# Patient Record
Sex: FEMALE | Race: WHITE | Employment: UNEMPLOYED | URBAN - METROPOLITAN AREA
[De-identification: names, ages, dates, MRNs, and addresses within clinical notes are randomized per-mention and may not be internally consistent; named-entity substitution may affect disease eponyms.]

---

## 2017-01-23 ENCOUNTER — HOSPITAL ENCOUNTER (OUTPATIENT)
Dept: MAMMOGRAPHY | Age: 42
Discharge: HOME | End: 2017-01-23
Attending: OBSTETRICS & GYNECOLOGY
Payer: COMMERCIAL

## 2017-01-23 DIAGNOSIS — Z12.31 VISIT FOR SCREENING MAMMOGRAM: ICD-10-CM

## 2017-01-23 PROCEDURE — 77067 SCR MAMMO BI INCL CAD: CPT

## 2017-01-23 NOTE — PROGRESS NOTES
Quick Note:    Your Screening Mammogram was normal. I recommend routine follow up or with any breast concerns.     ALKA Loco      ______

## 2017-07-22 ENCOUNTER — TELEPHONE (OUTPATIENT)
Dept: OBGYN CLINIC | Facility: CLINIC | Age: 42
End: 2017-07-22

## 2017-07-22 NOTE — TELEPHONE ENCOUNTER
Patient called, she started getting her period July 13th and up til now. It's not a lot but when she wipe there still some she can see. Also, she is having vaginal dryness.  Patient aware nurse will call her back on Monday, July 24th and she was fine with

## 2017-07-24 NOTE — TELEPHONE ENCOUNTER
Pt reports irregular cycle. Usually q 26 days, lasting 5 days. This month cycle 10 days late, lasting 10 days, light bleeding. Pt also reports vaginal dryness; advised on using OTC lubricant.   Pt transferred to schedule annual, track cycle until appt, d

## 2017-10-03 ENCOUNTER — OFFICE VISIT (OUTPATIENT)
Dept: OBGYN CLINIC | Facility: CLINIC | Age: 42
End: 2017-10-03

## 2017-10-03 VITALS
HEART RATE: 79 BPM | SYSTOLIC BLOOD PRESSURE: 102 MMHG | WEIGHT: 196 LBS | HEIGHT: 64.5 IN | DIASTOLIC BLOOD PRESSURE: 68 MMHG | BODY MASS INDEX: 33.05 KG/M2

## 2017-10-03 DIAGNOSIS — Z01.419 WELL FEMALE EXAM WITH ROUTINE GYNECOLOGICAL EXAM: Primary | ICD-10-CM

## 2017-10-03 PROCEDURE — 99396 PREV VISIT EST AGE 40-64: CPT | Performed by: OBSTETRICS & GYNECOLOGY

## 2017-10-03 RX ORDER — SPIRONOLACTONE 25 MG/1
25 TABLET ORAL DAILY
Qty: 60 TABLET | Refills: 0 | Status: SHIPPED | OUTPATIENT
Start: 2017-10-03

## 2017-10-03 RX ORDER — CHOLECALCIFEROL (VITAMIN D3) 125 MCG
1 TABLET ORAL
COMMUNITY

## 2017-10-03 NOTE — PROGRESS NOTES
Annual  Still with irregular menses, lasting longer and heavier, tolerable  Bloated prior to menses  Vaginal dryness    PAP, mammogram normal within last year    ROS: No Cardiac, Respiratory, GI,  or Neurological symptoms.     PE:  GENERAL: well developed

## 2017-11-13 PROBLEM — J30.1 CHRONIC SEASONAL ALLERGIC RHINITIS DUE TO POLLEN: Status: ACTIVE | Noted: 2017-11-13

## 2017-11-14 ENCOUNTER — LAB ENCOUNTER (OUTPATIENT)
Dept: LAB | Age: 42
End: 2017-11-14
Attending: INTERNAL MEDICINE
Payer: COMMERCIAL

## 2017-11-14 DIAGNOSIS — Z00.00 ROUTINE GENERAL MEDICAL EXAMINATION AT A HEALTH CARE FACILITY: ICD-10-CM

## 2017-11-14 PROCEDURE — 82306 VITAMIN D 25 HYDROXY: CPT

## 2017-11-14 PROCEDURE — 84443 ASSAY THYROID STIM HORMONE: CPT

## 2017-11-14 PROCEDURE — 83540 ASSAY OF IRON: CPT

## 2017-11-14 PROCEDURE — 81001 URINALYSIS AUTO W/SCOPE: CPT

## 2017-11-14 PROCEDURE — 80061 LIPID PANEL: CPT

## 2017-11-14 PROCEDURE — 36415 COLL VENOUS BLD VENIPUNCTURE: CPT

## 2017-11-14 PROCEDURE — 84439 ASSAY OF FREE THYROXINE: CPT

## 2017-11-14 PROCEDURE — 85025 COMPLETE CBC W/AUTO DIFF WBC: CPT

## 2017-11-14 PROCEDURE — 82728 ASSAY OF FERRITIN: CPT

## 2017-11-14 PROCEDURE — 80053 COMPREHEN METABOLIC PANEL: CPT

## 2017-11-14 PROCEDURE — 83550 IRON BINDING TEST: CPT

## 2017-12-22 ENCOUNTER — HOSPITAL ENCOUNTER (OUTPATIENT)
Age: 42
Discharge: HOME | End: 2017-12-22
Attending: FAMILY MEDICINE
Payer: COMMERCIAL

## 2017-12-22 VITALS
HEART RATE: 70 BPM | DIASTOLIC BLOOD PRESSURE: 71 MMHG | TEMPERATURE: 99 F | OXYGEN SATURATION: 98 % | SYSTOLIC BLOOD PRESSURE: 116 MMHG | RESPIRATION RATE: 18 BRPM

## 2017-12-22 DIAGNOSIS — J01.90 ACUTE SINUSITIS, RECURRENCE NOT SPECIFIED, UNSPECIFIED LOCATION: Primary | ICD-10-CM

## 2017-12-22 PROCEDURE — 99213 OFFICE O/P EST LOW 20 MIN: CPT

## 2017-12-22 PROCEDURE — 99204 OFFICE O/P NEW MOD 45 MIN: CPT

## 2017-12-22 RX ORDER — AMOXICILLIN AND CLAVULANATE POTASSIUM 875; 125 MG/1; MG/1
1 TABLET, FILM COATED ORAL 2 TIMES DAILY
Qty: 20 TABLET | Refills: 0 | Status: SHIPPED | OUTPATIENT
Start: 2017-12-22 | End: 2018-01-01

## 2017-12-22 RX ORDER — FLUCONAZOLE 150 MG/1
150 TABLET ORAL ONCE
Qty: 2 TABLET | Refills: 0 | Status: SHIPPED | OUTPATIENT
Start: 2017-12-22 | End: 2017-12-22

## 2017-12-22 NOTE — ED PROVIDER NOTES
Patient Seen in: [id number] [facility]    History   Patient presents with:  Sinus Problem    Stated Complaint: sinus    HPI    78-year-old female with no significant past medical history presents today for sinus congestion, headaches.   This bee None (Room air)    Current:/71   Pulse 70   Temp 98.5 °F (36.9 °C) (Temporal)   Resp 18   LMP 12/21/2017   SpO2 98%         Physical Exam   Constitutional: She is oriented to person, place, and time. She appears well-developed and well-nourished.    H Discharge Medication List

## 2017-12-22 NOTE — ED INITIAL ASSESSMENT (HPI)
Patient has been stating sinus pain and pressure that has been getting worse over the last couple of days. No fevers. Tylenol/Advil helps with pain.

## 2018-11-14 ENCOUNTER — CHARTING TRANS (OUTPATIENT)
Dept: OTHER | Age: 43
End: 2018-11-14

## 2019-03-08 ENCOUNTER — LAB ENCOUNTER (OUTPATIENT)
Dept: LAB | Age: 44
End: 2019-03-08
Attending: INTERNAL MEDICINE
Payer: COMMERCIAL

## 2019-03-08 DIAGNOSIS — Z13.1 DIABETES MELLITUS SCREENING: ICD-10-CM

## 2019-03-08 DIAGNOSIS — Z13.220 LIPID SCREENING: ICD-10-CM

## 2019-03-08 DIAGNOSIS — D50.8 OTHER IRON DEFICIENCY ANEMIA: ICD-10-CM

## 2019-03-08 DIAGNOSIS — E55.9 VITAMIN D DEFICIENCY: ICD-10-CM

## 2019-03-08 LAB
ALBUMIN SERPL-MCNC: 4 G/DL (ref 3.4–5)
ALBUMIN/GLOB SERPL: 1.2 {RATIO} (ref 1–2)
ALP LIVER SERPL-CCNC: 52 U/L (ref 37–98)
ALT SERPL-CCNC: 27 U/L (ref 13–56)
ANION GAP SERPL CALC-SCNC: 6 MMOL/L (ref 0–18)
AST SERPL-CCNC: 23 U/L (ref 15–37)
BASOPHILS # BLD AUTO: 0.08 X10(3) UL (ref 0–0.2)
BASOPHILS NFR BLD AUTO: 1.1 %
BILIRUB SERPL-MCNC: 0.7 MG/DL (ref 0.1–2)
BILIRUB UR QL STRIP.AUTO: NEGATIVE
BUN BLD-MCNC: 13 MG/DL (ref 7–18)
BUN/CREAT SERPL: 16.9 (ref 10–20)
CALCIUM BLD-MCNC: 8.6 MG/DL (ref 8.5–10.1)
CHLORIDE SERPL-SCNC: 106 MMOL/L (ref 98–107)
CHOLEST SMN-MCNC: 201 MG/DL (ref ?–200)
CLARITY UR REFRACT.AUTO: CLEAR
CO2 SERPL-SCNC: 27 MMOL/L (ref 21–32)
COLOR UR AUTO: YELLOW
CREAT BLD-MCNC: 0.77 MG/DL (ref 0.55–1.02)
DEPRECATED HBV CORE AB SER IA-ACNC: 28.3 NG/ML (ref 12–240)
DEPRECATED RDW RBC AUTO: 42.4 FL (ref 35.1–46.3)
EOSINOPHIL # BLD AUTO: 0.32 X10(3) UL (ref 0–0.7)
EOSINOPHIL NFR BLD AUTO: 4.5 %
ERYTHROCYTE [DISTWIDTH] IN BLOOD BY AUTOMATED COUNT: 12.7 % (ref 11–15)
EST. AVERAGE GLUCOSE BLD GHB EST-MCNC: 100 MG/DL (ref 68–126)
GLOBULIN PLAS-MCNC: 3.4 G/DL (ref 2.8–4.4)
GLUCOSE BLD-MCNC: 83 MG/DL (ref 70–99)
GLUCOSE UR STRIP.AUTO-MCNC: NEGATIVE MG/DL
HBA1C MFR BLD HPLC: 5.1 % (ref ?–5.7)
HCT VFR BLD AUTO: 42.9 % (ref 35–48)
HDLC SERPL-MCNC: 64 MG/DL (ref 40–59)
HGB BLD-MCNC: 14 G/DL (ref 12–16)
HYALINE CASTS #/AREA URNS AUTO: PRESENT /LPF
IMM GRANULOCYTES # BLD AUTO: 0.01 X10(3) UL (ref 0–1)
IMM GRANULOCYTES NFR BLD: 0.1 %
IRON SATURATION: 36 % (ref 15–50)
IRON SERPL-MCNC: 151 UG/DL (ref 50–170)
KETONES UR STRIP.AUTO-MCNC: NEGATIVE MG/DL
LDLC SERPL CALC-MCNC: 118 MG/DL (ref ?–100)
LEUKOCYTE ESTERASE UR QL STRIP.AUTO: NEGATIVE
LYMPHOCYTES # BLD AUTO: 1.98 X10(3) UL (ref 1–4)
LYMPHOCYTES NFR BLD AUTO: 27.8 %
M PROTEIN MFR SERPL ELPH: 7.4 G/DL (ref 6.4–8.2)
MCH RBC QN AUTO: 29.6 PG (ref 26–34)
MCHC RBC AUTO-ENTMCNC: 32.6 G/DL (ref 31–37)
MCV RBC AUTO: 90.7 FL (ref 80–100)
MONOCYTES # BLD AUTO: 0.57 X10(3) UL (ref 0.1–1)
MONOCYTES NFR BLD AUTO: 8 %
NEUTROPHILS # BLD AUTO: 4.16 X10 (3) UL (ref 1.5–7.7)
NEUTROPHILS # BLD AUTO: 4.16 X10(3) UL (ref 1.5–7.7)
NEUTROPHILS NFR BLD AUTO: 58.5 %
NITRITE UR QL STRIP.AUTO: NEGATIVE
NONHDLC SERPL-MCNC: 137 MG/DL (ref ?–130)
OSMOLALITY SERPL CALC.SUM OF ELEC: 287 MOSM/KG (ref 275–295)
PH UR STRIP.AUTO: 6 [PH] (ref 4.5–8)
PLATELET # BLD AUTO: 222 10(3)UL (ref 150–450)
POTASSIUM SERPL-SCNC: 3.9 MMOL/L (ref 3.5–5.1)
PROT UR STRIP.AUTO-MCNC: NEGATIVE MG/DL
RBC # BLD AUTO: 4.73 X10(6)UL (ref 3.8–5.3)
SODIUM SERPL-SCNC: 139 MMOL/L (ref 136–145)
SP GR UR STRIP.AUTO: 1.02 (ref 1–1.03)
T4 FREE SERPL-MCNC: 0.8 NG/DL (ref 0.8–1.7)
TOTAL IRON BINDING CAPACITY: 420 UG/DL (ref 240–450)
TRANSFERRIN SERPL-MCNC: 282 MG/DL (ref 200–360)
TRIGL SERPL-MCNC: 93 MG/DL (ref 30–149)
TSI SER-ACNC: 2.54 MIU/ML (ref 0.36–3.74)
UROBILINOGEN UR STRIP.AUTO-MCNC: <2 MG/DL
VIT D+METAB SERPL-MCNC: 38.3 NG/ML (ref 30–100)
VLDLC SERPL CALC-MCNC: 19 MG/DL (ref 0–30)
WBC # BLD AUTO: 7.1 X10(3) UL (ref 4–11)

## 2019-03-08 PROCEDURE — 80061 LIPID PANEL: CPT

## 2019-03-08 PROCEDURE — 82728 ASSAY OF FERRITIN: CPT

## 2019-03-08 PROCEDURE — 83540 ASSAY OF IRON: CPT

## 2019-03-08 PROCEDURE — 82306 VITAMIN D 25 HYDROXY: CPT

## 2019-03-08 PROCEDURE — 84443 ASSAY THYROID STIM HORMONE: CPT

## 2019-03-08 PROCEDURE — 80053 COMPREHEN METABOLIC PANEL: CPT

## 2019-03-08 PROCEDURE — 84439 ASSAY OF FREE THYROXINE: CPT

## 2019-03-08 PROCEDURE — 83036 HEMOGLOBIN GLYCOSYLATED A1C: CPT

## 2019-03-08 PROCEDURE — 81001 URINALYSIS AUTO W/SCOPE: CPT

## 2019-03-08 PROCEDURE — 83550 IRON BINDING TEST: CPT

## 2019-03-08 PROCEDURE — 85025 COMPLETE CBC W/AUTO DIFF WBC: CPT

## 2019-03-20 PROBLEM — M72.2 PLANTAR FASCIITIS OF RIGHT FOOT: Status: ACTIVE | Noted: 2019-03-20

## 2019-03-20 PROBLEM — E66.811 OBESITY (BMI 30.0-34.9): Status: ACTIVE | Noted: 2019-03-20

## 2019-03-20 PROBLEM — E66.9 OBESITY (BMI 30.0-34.9): Status: ACTIVE | Noted: 2019-03-20

## 2019-03-28 ENCOUNTER — WALK IN (OUTPATIENT)
Dept: URGENT CARE | Age: 44
End: 2019-03-28

## 2019-03-28 VITALS
DIASTOLIC BLOOD PRESSURE: 64 MMHG | TEMPERATURE: 98.3 F | RESPIRATION RATE: 20 BRPM | OXYGEN SATURATION: 99 % | SYSTOLIC BLOOD PRESSURE: 108 MMHG | HEART RATE: 82 BPM

## 2019-03-28 DIAGNOSIS — J06.9 URI, ACUTE: Primary | ICD-10-CM

## 2019-03-28 DIAGNOSIS — J32.9 SINUSITIS, UNSPECIFIED CHRONICITY, UNSPECIFIED LOCATION: ICD-10-CM

## 2019-03-28 DIAGNOSIS — J98.01 BRONCHOSPASM: ICD-10-CM

## 2019-03-28 PROCEDURE — 99204 OFFICE O/P NEW MOD 45 MIN: CPT | Performed by: PEDIATRICS

## 2019-03-28 RX ORDER — CHOLECALCIFEROL (VITAMIN D3) 125 MCG
1 TABLET ORAL
COMMUNITY

## 2019-03-28 RX ORDER — BENZONATATE 200 MG/1
200 CAPSULE ORAL 3 TIMES DAILY PRN
Qty: 30 CAPSULE | Refills: 0 | Status: SHIPPED | OUTPATIENT
Start: 2019-03-28 | End: 2019-04-07

## 2019-03-28 RX ORDER — CETIRIZINE HYDROCHLORIDE 10 MG/1
TABLET ORAL
COMMUNITY

## 2019-03-28 RX ORDER — FLUTICASONE PROPIONATE 50 MCG
2 SPRAY, SUSPENSION (ML) NASAL
COMMUNITY
Start: 2019-02-25

## 2019-03-28 RX ORDER — CEFDINIR 300 MG/1
300 CAPSULE ORAL 2 TIMES DAILY
Qty: 20 CAPSULE | Refills: 0 | Status: SHIPPED | OUTPATIENT
Start: 2019-03-28 | End: 2019-03-28 | Stop reason: ALTCHOICE

## 2019-03-28 RX ORDER — AMOXICILLIN AND CLAVULANATE POTASSIUM 875; 125 MG/1; MG/1
1 TABLET, FILM COATED ORAL EVERY 12 HOURS
Qty: 20 TABLET | Refills: 0 | Status: SHIPPED | OUTPATIENT
Start: 2019-03-28 | End: 2019-09-19 | Stop reason: SDUPTHER

## 2019-03-28 RX ORDER — SENNOSIDES 8.6 MG
CAPSULE ORAL
COMMUNITY

## 2019-03-28 RX ORDER — ALBUTEROL SULFATE 90 UG/1
AEROSOL, METERED RESPIRATORY (INHALATION)
Qty: 1 INHALER | Refills: 1 | Status: SHIPPED | OUTPATIENT
Start: 2019-03-28

## 2019-09-19 ENCOUNTER — WALK IN (OUTPATIENT)
Dept: URGENT CARE | Age: 44
End: 2019-09-19

## 2019-09-19 VITALS
TEMPERATURE: 98.1 F | OXYGEN SATURATION: 100 % | HEART RATE: 82 BPM | DIASTOLIC BLOOD PRESSURE: 64 MMHG | RESPIRATION RATE: 14 BRPM | SYSTOLIC BLOOD PRESSURE: 118 MMHG

## 2019-09-19 DIAGNOSIS — J32.9 SINUSITIS, UNSPECIFIED CHRONICITY, UNSPECIFIED LOCATION: Primary | ICD-10-CM

## 2019-09-19 PROCEDURE — 99214 OFFICE O/P EST MOD 30 MIN: CPT | Performed by: FAMILY MEDICINE

## 2019-09-19 RX ORDER — AMOXICILLIN AND CLAVULANATE POTASSIUM 875; 125 MG/1; MG/1
1 TABLET, FILM COATED ORAL 2 TIMES DAILY
Qty: 20 TABLET | Refills: 0 | Status: SHIPPED | OUTPATIENT
Start: 2019-09-19 | End: 2019-09-29

## 2020-09-28 ENCOUNTER — OFFICE VISIT (OUTPATIENT)
Dept: OBGYN CLINIC | Facility: CLINIC | Age: 45
End: 2020-09-28
Payer: COMMERCIAL

## 2020-09-28 VITALS
TEMPERATURE: 98 F | WEIGHT: 198 LBS | SYSTOLIC BLOOD PRESSURE: 120 MMHG | DIASTOLIC BLOOD PRESSURE: 78 MMHG | BODY MASS INDEX: 33.8 KG/M2 | HEIGHT: 64 IN

## 2020-09-28 DIAGNOSIS — Z12.4 SCREENING FOR MALIGNANT NEOPLASM OF THE CERVIX: ICD-10-CM

## 2020-09-28 DIAGNOSIS — Z12.31 VISIT FOR SCREENING MAMMOGRAM: ICD-10-CM

## 2020-09-28 DIAGNOSIS — Z01.419 WELL FEMALE EXAM WITH ROUTINE GYNECOLOGICAL EXAM: Primary | ICD-10-CM

## 2020-09-28 PROCEDURE — 88175 CYTOPATH C/V AUTO FLUID REDO: CPT | Performed by: OBSTETRICS & GYNECOLOGY

## 2020-09-28 PROCEDURE — 99396 PREV VISIT EST AGE 40-64: CPT | Performed by: OBSTETRICS & GYNECOLOGY

## 2020-09-28 PROCEDURE — 3074F SYST BP LT 130 MM HG: CPT | Performed by: OBSTETRICS & GYNECOLOGY

## 2020-09-28 PROCEDURE — 3078F DIAST BP <80 MM HG: CPT | Performed by: OBSTETRICS & GYNECOLOGY

## 2020-09-28 PROCEDURE — 3008F BODY MASS INDEX DOCD: CPT | Performed by: OBSTETRICS & GYNECOLOGY

## 2020-09-28 NOTE — PROGRESS NOTES
Annual  Last seen 2017  Menses more regular now  Still with night sweats, hot flashes, sometimes, she is OK  Had RLQ pain, seen in ED August 2020, had small right ovarian cyst  No vaginal dryness, produces mucous    ROS: No Cardiac, Respiratory, GI,  or

## 2020-10-15 ENCOUNTER — ULTRASOUND ENCOUNTER (OUTPATIENT)
Dept: OBGYN CLINIC | Facility: CLINIC | Age: 45
End: 2020-10-15
Payer: COMMERCIAL

## 2020-10-19 ENCOUNTER — TELEPHONE (OUTPATIENT)
Dept: OBGYN CLINIC | Facility: CLINIC | Age: 45
End: 2020-10-19

## 2020-10-19 DIAGNOSIS — N83.201 RIGHT OVARIAN CYST: Primary | ICD-10-CM

## 2020-10-19 PROCEDURE — 76830 TRANSVAGINAL US NON-OB: CPT | Performed by: OBSTETRICS & GYNECOLOGY

## 2020-10-19 PROCEDURE — 76856 US EXAM PELVIC COMPLETE: CPT | Performed by: OBSTETRICS & GYNECOLOGY

## 2020-11-12 ENCOUNTER — LAB ENCOUNTER (OUTPATIENT)
Dept: LAB | Age: 45
End: 2020-11-12
Attending: INTERNAL MEDICINE
Payer: COMMERCIAL

## 2020-11-12 ENCOUNTER — HOSPITAL ENCOUNTER (OUTPATIENT)
Dept: MAMMOGRAPHY | Age: 45
Discharge: HOME | End: 2020-11-12
Attending: OBSTETRICS & GYNECOLOGY
Payer: COMMERCIAL

## 2020-11-12 DIAGNOSIS — Z00.00 ROUTINE GENERAL MEDICAL EXAMINATION AT A HEALTH CARE FACILITY: ICD-10-CM

## 2020-11-12 DIAGNOSIS — Z12.31 VISIT FOR SCREENING MAMMOGRAM: ICD-10-CM

## 2020-11-12 PROCEDURE — 81001 URINALYSIS AUTO W/SCOPE: CPT

## 2020-11-12 PROCEDURE — 87086 URINE CULTURE/COLONY COUNT: CPT

## 2020-11-12 PROCEDURE — 82306 VITAMIN D 25 HYDROXY: CPT

## 2020-11-12 PROCEDURE — 83550 IRON BINDING TEST: CPT

## 2020-11-12 PROCEDURE — 77067 SCR MAMMO BI INCL CAD: CPT | Performed by: OBSTETRICS & GYNECOLOGY

## 2020-11-12 PROCEDURE — 36415 COLL VENOUS BLD VENIPUNCTURE: CPT

## 2020-11-12 PROCEDURE — 84443 ASSAY THYROID STIM HORMONE: CPT

## 2020-11-12 PROCEDURE — 80061 LIPID PANEL: CPT

## 2020-11-12 PROCEDURE — 84439 ASSAY OF FREE THYROXINE: CPT

## 2020-11-12 PROCEDURE — 82728 ASSAY OF FERRITIN: CPT

## 2020-11-12 PROCEDURE — 83540 ASSAY OF IRON: CPT

## 2020-12-22 ENCOUNTER — ULTRASOUND ENCOUNTER (OUTPATIENT)
Dept: OBGYN CLINIC | Facility: CLINIC | Age: 45
End: 2020-12-22
Payer: COMMERCIAL

## 2020-12-22 DIAGNOSIS — N83.201 RIGHT OVARIAN CYST: Primary | ICD-10-CM

## 2020-12-22 PROCEDURE — 76830 TRANSVAGINAL US NON-OB: CPT | Performed by: OBSTETRICS & GYNECOLOGY

## 2020-12-22 PROCEDURE — 76856 US EXAM PELVIC COMPLETE: CPT | Performed by: OBSTETRICS & GYNECOLOGY

## 2021-12-12 ENCOUNTER — HOSPITAL ENCOUNTER (OUTPATIENT)
Age: 46
Discharge: HOME | End: 2021-12-12
Payer: COMMERCIAL

## 2021-12-12 VITALS — TEMPERATURE: 98 F

## 2021-12-12 PROCEDURE — 90686 IIV4 VACC NO PRSV 0.5 ML IM: CPT

## 2021-12-12 PROCEDURE — 90471 IMMUNIZATION ADMIN: CPT

## 2022-03-03 PROBLEM — M72.2 PLANTAR FASCIITIS OF RIGHT FOOT: Status: RESOLVED | Noted: 2019-03-20 | Resolved: 2022-03-03

## 2022-03-04 PROBLEM — E78.00 ELEVATED CHOLESTEROL: Status: ACTIVE | Noted: 2022-03-04

## 2022-03-15 ENCOUNTER — OFFICE VISIT (OUTPATIENT)
Dept: OBGYN CLINIC | Facility: CLINIC | Age: 47
End: 2022-03-15
Payer: COMMERCIAL

## 2022-03-15 VITALS
HEIGHT: 63.75 IN | DIASTOLIC BLOOD PRESSURE: 70 MMHG | SYSTOLIC BLOOD PRESSURE: 114 MMHG | WEIGHT: 177.19 LBS | BODY MASS INDEX: 30.62 KG/M2 | HEART RATE: 80 BPM

## 2022-03-15 DIAGNOSIS — Z12.31 VISIT FOR SCREENING MAMMOGRAM: ICD-10-CM

## 2022-03-15 DIAGNOSIS — Z01.419 WELL FEMALE EXAM WITH ROUTINE GYNECOLOGICAL EXAM: Primary | ICD-10-CM

## 2022-03-15 PROCEDURE — 3074F SYST BP LT 130 MM HG: CPT | Performed by: OBSTETRICS & GYNECOLOGY

## 2022-03-15 PROCEDURE — 3008F BODY MASS INDEX DOCD: CPT | Performed by: OBSTETRICS & GYNECOLOGY

## 2022-03-15 PROCEDURE — 99396 PREV VISIT EST AGE 40-64: CPT | Performed by: OBSTETRICS & GYNECOLOGY

## 2022-03-15 PROCEDURE — 3078F DIAST BP <80 MM HG: CPT | Performed by: OBSTETRICS & GYNECOLOGY

## 2022-03-15 RX ORDER — LANSOPRAZOLE 30 MG/1
TABLET, ORALLY DISINTEGRATING, DELAYED RELEASE ORAL
COMMUNITY
Start: 2022-02-10

## 2022-03-15 NOTE — PROGRESS NOTES
Annual  No C/O  No menses over two months, has gone 6 months  Daughter is 14  Vaginal dryness, ERT discussed  OTC estroven etc, will try    ROS: No Cardiac, Respiratory, GI,  or Neurological symptoms.     PE:  GENERAL: well developed, well nourished, in no apparent distress alert oriented x 3  SKIN: no rashes, no suspicious lesions  NECK: supple; no thyroidmegaly, no adenopathy  LUNGS: clear  COR: regular  BREAST: firm nontender, no palpable masses or LN's, no nipple discharge, no skin retraction, no axillary adenopathy,   ABDOMEN: Soft, non distended; non tender, no masses  GYNE/: External Genitalia: Normal      Vagina: normal      Uterus: mid, mobile, non tender, Normal size     Cervix: NL, no lesions     Adnexa: non tender, no masses,   EXTREMITIES:  non tender without edema or DVT  LYMPH NODES:negative    A/P: Normal exam  PAP normal 2020  Mammogram

## 2022-04-06 ENCOUNTER — HOSPITAL ENCOUNTER (OUTPATIENT)
Dept: MAMMOGRAPHY | Age: 47
Discharge: HOME | End: 2022-04-06
Attending: OBSTETRICS & GYNECOLOGY
Payer: COMMERCIAL

## 2022-04-06 DIAGNOSIS — Z12.31 VISIT FOR SCREENING MAMMOGRAM: ICD-10-CM

## 2022-04-06 PROCEDURE — 77067 SCR MAMMO BI INCL CAD: CPT | Performed by: OBSTETRICS & GYNECOLOGY

## 2023-12-01 ENCOUNTER — HOSPITAL ENCOUNTER (OUTPATIENT)
Age: 48
Discharge: HOME | End: 2023-12-01
Payer: COMMERCIAL

## 2023-12-01 VITALS
TEMPERATURE: 98 F | SYSTOLIC BLOOD PRESSURE: 130 MMHG | RESPIRATION RATE: 16 BRPM | WEIGHT: 175 LBS | BODY MASS INDEX: 29.88 KG/M2 | OXYGEN SATURATION: 100 % | HEIGHT: 64 IN | DIASTOLIC BLOOD PRESSURE: 81 MMHG | HEART RATE: 80 BPM

## 2023-12-01 DIAGNOSIS — J01.40 ACUTE NON-RECURRENT PANSINUSITIS: ICD-10-CM

## 2023-12-01 DIAGNOSIS — R09.81 NASAL CONGESTION: Primary | ICD-10-CM

## 2023-12-01 LAB — SARS-COV-2 RNA RESP QL NAA+PROBE: NOT DETECTED

## 2023-12-01 PROCEDURE — 99203 OFFICE O/P NEW LOW 30 MIN: CPT | Performed by: PHYSICIAN ASSISTANT

## 2023-12-01 PROCEDURE — U0002 COVID-19 LAB TEST NON-CDC: HCPCS | Performed by: PHYSICIAN ASSISTANT

## 2023-12-01 RX ORDER — AMOXICILLIN AND CLAVULANATE POTASSIUM 875; 125 MG/1; MG/1
1 TABLET, FILM COATED ORAL 2 TIMES DAILY
Qty: 20 TABLET | Refills: 0 | Status: SHIPPED | OUTPATIENT
Start: 2023-12-01 | End: 2023-12-11

## 2023-12-01 NOTE — DISCHARGE INSTRUCTIONS
Continue your flonase  Continue your zyrtec take augmentin twice a day until gone  Follow up with primary care doctor in 48 hours  Return to the ER if symptoms worsen

## 2023-12-01 NOTE — ED INITIAL ASSESSMENT (HPI)
Sudden onset of sinus congestion and cough while traveling. Taking allergy med and flonase. Onset Sunday and home yesterday.

## 2023-12-18 ENCOUNTER — HOSPITAL ENCOUNTER (OUTPATIENT)
Age: 48
Discharge: HOME | End: 2023-12-18
Payer: COMMERCIAL

## 2023-12-18 VITALS
TEMPERATURE: 98 F | BODY MASS INDEX: 29.88 KG/M2 | DIASTOLIC BLOOD PRESSURE: 70 MMHG | OXYGEN SATURATION: 100 % | RESPIRATION RATE: 16 BRPM | WEIGHT: 175 LBS | HEART RATE: 78 BPM | HEIGHT: 64 IN | SYSTOLIC BLOOD PRESSURE: 129 MMHG

## 2023-12-18 DIAGNOSIS — B34.9 VIRAL SYNDROME: Primary | ICD-10-CM

## 2023-12-18 LAB
POCT INFLUENZA A: NEGATIVE
POCT INFLUENZA B: NEGATIVE
SARS-COV-2 RNA RESP QL NAA+PROBE: NOT DETECTED

## 2023-12-18 PROCEDURE — 87502 INFLUENZA DNA AMP PROBE: CPT | Performed by: PHYSICIAN ASSISTANT

## 2023-12-18 PROCEDURE — U0002 COVID-19 LAB TEST NON-CDC: HCPCS | Performed by: PHYSICIAN ASSISTANT

## 2023-12-18 PROCEDURE — 99213 OFFICE O/P EST LOW 20 MIN: CPT | Performed by: PHYSICIAN ASSISTANT

## 2023-12-18 RX ORDER — OSELTAMIVIR PHOSPHATE 75 MG/1
75 CAPSULE ORAL 2 TIMES DAILY
Qty: 10 CAPSULE | Refills: 0 | Status: SHIPPED | OUTPATIENT
Start: 2023-12-18 | End: 2023-12-23

## 2023-12-18 RX ORDER — ALBUTEROL SULFATE 90 UG/1
2 AEROSOL, METERED RESPIRATORY (INHALATION) EVERY 4 HOURS PRN
Qty: 1 EACH | Refills: 0 | Status: SHIPPED | OUTPATIENT
Start: 2023-12-18 | End: 2024-01-17

## 2023-12-18 RX ORDER — BENZONATATE 100 MG/1
100 CAPSULE ORAL 3 TIMES DAILY PRN
Qty: 30 CAPSULE | Refills: 0 | Status: SHIPPED | OUTPATIENT
Start: 2023-12-18 | End: 2024-01-17

## 2023-12-18 NOTE — DISCHARGE INSTRUCTIONS
COVID and influenza are negative. Recommend starting Tamiflu regardless. Tessalon and albuterol provided.   If you develop acute chest pain or shortness of breath please return for chest x-ray to rule out pneumonia

## 2024-09-30 ENCOUNTER — HOSPITAL ENCOUNTER (OUTPATIENT)
Age: 49
Discharge: HOME | End: 2024-09-30
Payer: COMMERCIAL

## 2024-09-30 VITALS
OXYGEN SATURATION: 97 % | DIASTOLIC BLOOD PRESSURE: 88 MMHG | SYSTOLIC BLOOD PRESSURE: 121 MMHG | HEART RATE: 68 BPM | TEMPERATURE: 98 F | RESPIRATION RATE: 18 BRPM

## 2024-09-30 DIAGNOSIS — N30.01 ACUTE CYSTITIS WITH HEMATURIA: Primary | ICD-10-CM

## 2024-09-30 LAB
BILIRUB UR QL STRIP: NEGATIVE
COLOR UR: YELLOW
GLUCOSE UR STRIP-MCNC: NEGATIVE MG/DL
KETONES UR STRIP-MCNC: NEGATIVE MG/DL
NITRITE UR QL STRIP: NEGATIVE
PH UR STRIP: 5.5 [PH]
PROT UR STRIP-MCNC: NEGATIVE MG/DL
SP GR UR STRIP: 1.01
UROBILINOGEN UR STRIP-ACNC: <2 MG/DL

## 2024-09-30 PROCEDURE — 99204 OFFICE O/P NEW MOD 45 MIN: CPT | Performed by: NURSE PRACTITIONER

## 2024-09-30 PROCEDURE — 81002 URINALYSIS NONAUTO W/O SCOPE: CPT | Performed by: NURSE PRACTITIONER

## 2024-09-30 RX ORDER — CEPHALEXIN 500 MG/1
500 CAPSULE ORAL 2 TIMES DAILY
Qty: 14 CAPSULE | Refills: 0 | Status: SHIPPED | OUTPATIENT
Start: 2024-09-30 | End: 2024-10-07

## 2024-09-30 RX ORDER — PHENAZOPYRIDINE HYDROCHLORIDE 100 MG/1
100 TABLET, FILM COATED ORAL 3 TIMES DAILY PRN
Qty: 6 TABLET | Refills: 0 | Status: SHIPPED | OUTPATIENT
Start: 2024-09-30 | End: 2024-10-07

## 2024-09-30 NOTE — ED PROVIDER NOTES
Patient Seen in: [facility]      History     Chief Complaint   Patient presents with    Urinary Symptoms     Pressure, some pain in lower abdomen.   Discomfort. - Entered by patient    Dysuria     Stated Complaint: Urinary Symptoms - Pressure, some pain in lower abdomen.   Discomfort.    Subjective:   HPI    49-year-old female presents to me care with complaints of increased urine frequency and urgency with no burning for last couple days.  Increasing pressure over the bladder.  Last menstrual cycle was over 2 years ago.  Denies any vaginal discharge or odors.  Denies any flank pain no fever no nausea vomit diarrhea.  No other concerns at this time.  The patient's medication list, past medical history and social history elements as listed in today's nurse's notes were reviewed and agreed (except as otherwise stated in the HPI).  The patient's family history reviewed and determined to be noncontributory to the presenting problem.      Objective:   Past Medical History:    Allergic rhinitis    ANEMIA    Endometriosis    Fe deficiency anemia    GERD (gastroesophageal reflux disease)    egd    Pap smear for cervical cancer screening    wnl              Past Surgical History:   Procedure Laterality Date    Laparoscopy,pelvic,biopsy  2006                Social History     Socioeconomic History    Marital status:     Number of children: 1   Occupational History    Occupation: homemaker   Tobacco Use    Smoking status: Former     Current packs/day: 0.50     Average packs/day: 0.5 packs/day for 10.0 years (5.0 ttl pk-yrs)     Types: Cigarettes    Smokeless tobacco: Never   Vaping Use    Vaping status: Never Used   Substance and Sexual Activity    Alcohol use: Yes     Alcohol/week: 4.0 - 6.0 standard drinks of alcohol     Types: 4 - 6 Standard drinks or equivalent per week     Comment: Occasional    Drug use: No    Sexual activity: Yes     Partners: Male     Birth control/protection: Condom, Vasectomy    Other Topics Concern    Caffeine Concern Yes     Comment: 2-4 cups of coffee x day    Exercise Yes     Comment: 2-3 x week, cardio, weights    Seat Belt Yes              Review of Systems    Positive for stated Chief Complaint: Urinary Symptoms (Pressure, some pain in lower abdomen.   Discomfort. - Entered by patient) and Dysuria    Other systems are as noted in HPI.  Constitutional and vital signs reviewed.      All other systems reviewed and negative except as noted above.    Physical Exam     ED Triage Vitals [09/30/24 1459]   /88   Pulse 68   Resp 18   Temp 97.9 °F (36.6 °C)   Temp src Temporal   SpO2 97 %   O2 Device None (Room air)       Current Vitals:   Vital Signs  BP: 121/88  Pulse: 68  Resp: 18  Temp: 97.9 °F (36.6 °C)  Temp src: Temporal    Oxygen Therapy  SpO2: 97 %  O2 Device: None (Room air)            Physical Exam    GENERAL: The patient is well-developed well-nourished nontoxic, non-ill-appearing  Abdomen: Soft, nontender nondistended negative CVA tenderness bowel sounds are all 4 quadrants  CHEST/LUNGS:   There is no respiratory distress noted.  HEART/CARDIOVASCULAR:  There is no tachycardia.   SKIN: There is no rash.  NEURO: The patient is awake, alert, and oriented.  The patient is cooperative.    ED Course     Labs Reviewed   [facility] POCT URINALYSIS DIPSTICK - Abnormal; Notable for the following components:       Result Value    Urine Clarity Slightly cloudy (*)     Blood, Urine Moderate (*)     Leukocyte esterase urine Large (*)     All other components within normal limits               MDM     Pertinent Labs & Imaging studies reviewed. (See chart for details)  Differential diagnosis considered but not limited to: UTI pyelonephritis vulvovaginitis  Patient coming in with urine frequency. Patient provided with pain medication. Labs reviewed see above. . Will treat for possible UTI. Will discharge on Keflex. Patient is comfortable with this plan.  Overall Pt looks good. Non-toxic,  well-hydrated and in no respiratory distress. Vital signs are reassuring. Exam is reassuring. I do not believe pt  requires and additional  diagnostic studiesor intervention. I believe pt  can be discharged home to continue evaluation as an outpatient. Follow-up provider given. Discharge instructions given and reviewed. Return for any problems. All understand and agreewith the plan.    Please note that this report has been produced using speech recognition software and may contain errors related to that system including, but not limited to, errors in grammar, punctuation, and spelling, as well as words and phrases that possibly may have been recognized inappropriately.  If there are any questions or concerns, contact the dictating provider for clarification.    Note to patient: The 21st Century Cures Act makes medical notes like these available to patients in the interest of transparency. However, this is a medical document intended as peer to peer communication. It is written in medical language and may contain abbreviations or verbiage that are unfamiliar. It may appear blunt or direct. Medical documents are intended to carry relevant information, facts as evident, and the clinical opinion of the practitioner.                                  Medical Decision Making      Disposition and Plan     Clinical Impression:  1. Acute cystitis with hematuria         Disposition:  Discharge  9/30/2024  3:27 pm    Follow-up:  Joselo Smith MD  [address] [facility]  [address]  [place] [id number] [phone]                Medications Prescribed:  Discharge Medication List as of 9/30/2024  3:28 PM        START taking these medications    Details   cephALEXin 500 MG Oral Cap Take 1 capsule (500 mg total) by mouth 2 (two) times daily for 7 days., Normal, Disp-14 capsule, R-0      phenazopyridine 100 MG Oral Tab Take 1 tablet (100 mg total) by mouth 3 (three) times daily as needed for Pain., Normal, Disp-6 tablet, R-0

## 2024-10-16 ENCOUNTER — OFFICE VISIT (OUTPATIENT)
Dept: OBGYN CLINIC | Facility: CLINIC | Age: 49
End: 2024-10-16
Payer: COMMERCIAL

## 2024-10-16 VITALS
BODY MASS INDEX: 29.47 KG/M2 | SYSTOLIC BLOOD PRESSURE: 122 MMHG | DIASTOLIC BLOOD PRESSURE: 72 MMHG | HEIGHT: 63.75 IN | HEART RATE: 83 BPM | WEIGHT: 170.5 LBS

## 2024-10-16 DIAGNOSIS — Z12.31 BREAST CANCER SCREENING BY MAMMOGRAM: ICD-10-CM

## 2024-10-16 DIAGNOSIS — Z01.419 ENCOUNTER FOR GYNECOLOGICAL EXAMINATION WITHOUT ABNORMAL FINDING: Primary | ICD-10-CM

## 2024-10-16 DIAGNOSIS — Z12.4 SCREENING FOR CERVICAL CANCER: ICD-10-CM

## 2024-10-16 PROCEDURE — 3008F BODY MASS INDEX DOCD: CPT | Performed by: OBSTETRICS & GYNECOLOGY

## 2024-10-16 PROCEDURE — 3078F DIAST BP <80 MM HG: CPT | Performed by: OBSTETRICS & GYNECOLOGY

## 2024-10-16 PROCEDURE — 99396 PREV VISIT EST AGE 40-64: CPT | Performed by: OBSTETRICS & GYNECOLOGY

## 2024-10-16 PROCEDURE — 3074F SYST BP LT 130 MM HG: CPT | Performed by: OBSTETRICS & GYNECOLOGY

## 2024-10-16 PROCEDURE — 87624 HPV HI-RISK TYP POOLED RSLT: CPT | Performed by: OBSTETRICS & GYNECOLOGY

## 2024-10-16 RX ORDER — PSEUDOEPHEDRINE HCL 120 MG/1
120 TABLET, FILM COATED, EXTENDED RELEASE ORAL DAILY
COMMUNITY

## 2024-10-16 RX ORDER — CALCIUM CARBONATE 750 MG/1
TABLET, CHEWABLE ORAL AS DIRECTED
COMMUNITY

## 2024-10-16 RX ORDER — MELATONIN
3 NIGHTLY
COMMUNITY

## 2024-10-16 NOTE — PATIENT INSTRUCTIONS
Vaginal moisturizers are intended for use routinely, typically two or three days per week, not just during sexual activity. These products are typically bioadhesives. Hyaluronic acid is often used as a key ingredient in vaginal moisturizers. Many moisturizer products are available in pharmacies and online (eg, Replens, Vagisil Moisturizer, Feminease, Moist Again, K-Y Liquibeads, Hyalo GYN, Revaree suppositories).       Lubricants are used only at the time of sexual activity. Lubricants may be water-based (eg, Astroglide, Slippery Stuff, K-Y Jelly), silicone-based (eg, Pjur, ID Millennium), or oil-based (Elegance Women's Lubricant); oil-based lubricants cause breakdown of latex condoms. Some clinicians  that lubricants with glycerin (also referred to as glycerol) may result in an increased risk of vulvovaginal candidiasis, but there is no evidence to support this and some data suggest that glycerin inhibits candidal growth     MANAGING MENOPAUSE    This information sheet is designed to help educate you about some of the decisions you may face in the coming years.  Menopause is a natural process which usually takes places in the late 40’s - early 50’s.  The average age of menopause (defined as no periods for one year) in the [place] is 52 years old, but perimenopausal symptoms often start 4 to 5 years prior to actual menopause.  During perimenopause, it is common for your periods to become irregular and less predictable.  They may be lighter or heavier than usual, and skipped cycles are also common.  Very irregular cycles should be discussed with your physician, as it could be a sign of a gynecologic problem.    During the perimenopause, you may experience some or all of the following menopausal symptoms:  hot flashes, night sweats, insomnia, vaginal dryness, skin changes, memory changes and mood swings/depression.  The severity of the symptoms varies widely from patient to patient, and many patients  experience little difficulty during this time.  Because hormone levels can change on an hourly/daily basis, hormone testing may not be necessary or helpful in many cases.  Treatment options are individualized and will depend on which symptoms are most bothersome.  Below are some general guidelines regarding management of bothersome menopausal symptoms that will help to guide you in your decision making with your physician.    As mentioned earlier, menopause is a natural process, and if your symptoms are not too bothersome, then your main goal is a maintain a healthy lifestyle and continue with your general preventative healthcare:    -Regular Pap smears  -Yearly mammograms  -Colonoscopy at least every ten years starting at age 50 (or more often, depending on risk factors)  -Monthly self breast examination  -Regular screening for high blood pressure, diabetes and under-active thyroid  -Bone Density screening for osteoporosis usually every 5 years starting at age 50  -A total of 0588-0546 mg calcium daily to help prevent osteoporosis.  The average women only gets approximately 600 mg of calcium from her diet, and therefore will need at least 400-600 mg of supplemental calcium (Oscal, Citracal, Tums Extra Strength, Viactiv)  -400 IU Vitamin D supplementation daily, or more if you are deficient.  A lot of calcium supplements have vitamin D as well.    Diet and Exercise  When women reach the menopausal years, metabolism tends to slow down and lean body mass starts to decrease.  In order to avoid weight gain, it is important to decrease calorie consumption by 10%, to decrease intake of simple carbohydrates (white flour, sugar, and potatoes) and to increase consumption of lean protein and complex carbohydrates (vegetables, fruits and whole grains).  As we age, regular exercise and stretching become even more important in order to maintain muscle mass, flexibility, cardiovascular health as well as bone density.  Brisk daily  walks and yoga are a good way to start an exercise program.    Hotflashes/Nightsweats/Insomnia  Probably the most bothersome menopausal symptoms.  The following is a list of the most common treatment options:    Lifestyle Modifications:    -Minimize stress with yoga, meditation, exercise  -Minimize alcohol and caffeine intake, especially before bedtime  -Sleep in layers of clothing and bed covers  -Keep a fan running at low speed    Non-Prescription Treatments:  -Herbal remedies such as Black Cohosh, Rubarb root (Estroven) and Sadia’s Wort are generally safe, but their effectiveness is somewhat limited, and doses vary widely  -Phytoestrogens are plant derivatives that have hormone-like effects.  They will generally be more effective than herbal remedies. Soy Protein is probably more effective than [place], but high doses of both are needed to have much effect (at least 50 mg of genistein, the active ingredient in Soy products, or 80 mg of [place])  -Relizen in another popular herbal remedy which can be ordered at www.[employer]     Prescription Treatments:  -Mild short-acting sleeping pills like Ambien can help with sleep but can cause dependency  -Hormone replacement therapy (see below) is the most effective treatment for hot flashes  -Antidepressant medication such as Effexor, Prozac and Paxil which affect serotonin levels in the brain have been shown to significantly reduce the amount and severity of hot flashes.  If you also are experiencing mood swings/depression, this may be a good option  -Clonidine is a high blood pressure medication which has also shown some benefit  - Veozah is a new prescription medication which can help with hot flashes.  It binds to a chemical in your brain that is known to trigger hot flashes.  It requires a blood test every three months because rarely it can affect your liver function.    Vaginal dryness  Vaginal dryness and pain or discomfort with intercourse can  occur within a few years after the start of menopause.  The most popular treatment for vaginal dryness is local estrogen therapy with vaginal tablets, cream, or rings.  Very little of the hormone gets into the bloodstream, so risks of therapy are typically less than with oral therapy.  Non-hormonal options, especially for use during intercourse, include vaginal lubricants such as Astroglide or Replens, which may be more natural feeling that KY Jelly.    Mood swings/depression  About 20% of women will experience depression at some point in their lives, and the perimenopause is a common time to experience this problem, both because of hormonal changes as well as life changes (children leaving home, loss of loved ones).  If symptoms begin to interfere with your ability to enjoy your normal activities, talk to your doctor about treatment with a mood stabilizer.    Hormone Replacement Therapy  The most effective treatment to relieve symptoms of menopause also causes the most confusion among patients.  HRT (estrogen and/or progesterone) may be an option for you if your symptoms are severe and/or other non-hormonal options are not giving you enough relief.  Options include estrogen pills, patches, creams and vaginal rings, and progesterone will need to be added if you still have your uterus.  Treatment is usually started at a low dose and increased slowly until there is adequate relief from symptoms.  The most common side effects are breast tenderness and bloating.    With regards to potential risks of taking HRT, doctors have been doing studies now for up to 30 years, and the general consensus is that for most patients who are having severe menopausal symptoms, HRT is safe.  Recent studies have shown a slight increase in the risk of blood clots, heart attack, breast cancer and stroke/dimentia.  The increase in breast cancer risk is approximately 8 more women per 10,000 patients taking hormones.  Studies have also shown a  decrease risk of osteoporosis and colon cancer.  The bottom line is that if your symptoms are severe and other treatment options have not worked, using the lowest effective dose of HRT will definitely give you relief with minimal risk.  A decision about whether to start HRT or not is one that you and your physician will make together after a careful weighing of the potential risks and benefits.    Bioidentical refers to a category of hormones that are biochemically similar to those produced by our own body.  These hormones would include estrone, estradiol, progesterone, and testosterone.  There are several FDA-approved medications that would be considered bioidentical and can be used safely for hormone replacement.  Often, the term “bioidentical” is confused with custom-compounded medications.  Most physicians prefer FDA approved medications as opposed to compounded medications because of better safety, purity, and clinical testing.  Also, compounded medications may not be covered by your prescription plan.  Marketing material will often suggest that these medications are safer than prescription hormones, but this is often misleading.  In general, compounded hormones carry the same risks as prescription medications.  For this reason, compounded medications are usually only considered when FDA approved medications have not achieved our treatment goals.

## 2024-10-16 NOTE — PROGRESS NOTES
Subjective:  Chief Complaint   Patient presents with    Annual     49 year old female who is presents today for annual well woman visit complaining of bothersome menopausal symptoms, including hot flashes, night sweats, insomnia and mood symptoms.  Had a bad UTI recently.  No bleeding for past two years.  Also complains of vaginal dryness and dyspareunia.    No LMP recorded. (Menstrual status: Menopause).   Hx Prior Abnormal Pap: No  Pap Date: 09/28/20  Pap Result Notes: wnl  Menarche: 9 (10/16/2024  7:45 AM)  Period Cycle (Days): no period for 2 years (10/16/2024  7:45 AM)  Period Duration (Days): n/a (10/16/2024  7:45 AM)  Period Flow: n/a (10/16/2024  7:45 AM)  Use of Birth Control (if yes, specify type): Vasectomy (10/16/2024  7:45 AM)  Hx Prior Abnormal Pap: No (10/16/2024  7:45 AM)  Pap Date: 09/28/20 (10/16/2024  7:45 AM)  Pap Result Notes: wnl (10/16/2024  7:45 AM)      Last Mammo:  2022  Last Colonoscopy: 11/2022 x 5 yrs  Last Pap smear: 2020     Abnormal Pap: n    Review of Systems:  Pertinent items are noted in the HPI.    Patient History:  New Medical Illness: None  New Surgeries: None  New Family History: None   reports that she has quit smoking. Her smoking use included cigarettes. She has a 5 pack-year smoking history. She has never used smokeless tobacco.   reports current alcohol use of about 4.0 - 6.0 standard drinks of alcohol per week.    Most Recent Immunizations   Administered Date(s) Administered    Covid-19 Vaccine Moderna 100 mcg/0.5 ml 01/05/2022    FLU VAC QIV SPLIT 3 YRS AND OLDER (09140) 09/23/2020    FLULAVAL 6 months & older 0.5 ml Prefilled syringe (82155) 12/12/2021    HEP A 04/24/2007    HEP B 04/24/2007    TDAP 03/09/2012   Deferred Date(s) Deferred    Influenza 09/07/2016       Objective:  /72   Pulse 83   Ht 63.75\"   Wt 170 lb 8 oz (77.3 kg)   Physical Examination:  General appearance: Well dressed, well nourished in no apparent distress  Neurologic/Psychiatric: Alert  and oriented to person, place and time, mood normal, affect appropriate  Head: Normocephalic without obvious deformity, atraumatic  Neck: No thyromegaly, supple, non-tender, no masses, no adenopathy  Lungs: Clear to auscultation bilaterally, no rales, wheezes or rhonchi  Breasts: Symmetric, non-tender, no masses, lesions, retraction, dimpling or discharge bilaterally, no axillary or supraclavicular lymphadenopathy  Heart:: Regular rate and rhythm, no gallops or murmurs  Abdomen: Soft, non-tender, non-distended, no masses, no hepatosplenomegaly, no hernias, no inguinal lymphadenopathy  Pelvic:    External genitalia- Normal, Bartholin's, urethra, skeins glands normal   Vagina- No vaginal lesions, no discharge   Urethra- Non-tender, no masses   Urethral Meatus- No lesions or masses, no prolapse   Bladder- Non-tender, no masses   Cervix- No lesions, long/closed, no cervical motion tenderness   Uterus- Normal sized, anteverted, non-tender, no masses   Adnexa-  Non-tender, no masses   Anus/Perineum- Normal, no masses or lesions  Extremities: Non-tender, full range of motion, no clubbing, cyanosis or edema  Skin:  General inspection- no rashes, lesions or discoloration  Rectum: No hemorrhoids, no masses.     Assessment/Plan:  Normal well-woman exam.  Yearly mammogram   Pap/HPV obtained.  Menopausal symptoms- reviewed treatment options including HRT and SSRI/Effexor.    Atrophic vaginitis- offered trial of vaginal estrogen.  Reviewed lubricants/moisturizers    Patient offered chaperone for exam, declined    Diagnoses and all orders for this visit:    Encounter for gynecological examination without abnormal finding    Screening for cervical cancer  -     ThinPrep PAP Smear; Future  -     Hpv Dna  High Risk , Thin Prep Collect; Future    Breast cancer screening by mammogram  -     [facility] TUCKER 2D+3D SCREENING BILAT (CPT=77067/42114); Future       Return in about 1 year (around 10/16/2025) for Annual Well Woman Exam.

## 2024-10-17 LAB — HPV E6+E7 MRNA CVX QL NAA+PROBE: NEGATIVE

## 2024-10-22 ENCOUNTER — HOSPITAL ENCOUNTER (OUTPATIENT)
Dept: MAMMOGRAPHY | Age: 49
Discharge: HOME | End: 2024-10-22
Attending: OBSTETRICS & GYNECOLOGY
Payer: COMMERCIAL

## 2024-10-22 DIAGNOSIS — Z12.31 BREAST CANCER SCREENING BY MAMMOGRAM: ICD-10-CM

## 2024-10-22 LAB
.: NORMAL
.: NORMAL

## 2024-10-22 PROCEDURE — 77067 SCR MAMMO BI INCL CAD: CPT | Performed by: OBSTETRICS & GYNECOLOGY

## 2024-10-22 PROCEDURE — 77063 BREAST TOMOSYNTHESIS BI: CPT | Performed by: OBSTETRICS & GYNECOLOGY

## 2024-10-30 ENCOUNTER — HOSPITAL ENCOUNTER (OUTPATIENT)
Dept: MAMMOGRAPHY | Age: 49
Discharge: HOME | End: 2024-10-30
Attending: OBSTETRICS & GYNECOLOGY
Payer: COMMERCIAL

## 2024-10-30 DIAGNOSIS — R92.2 INCONCLUSIVE MAMMOGRAM: ICD-10-CM

## 2024-10-30 PROCEDURE — 77061 BREAST TOMOSYNTHESIS UNI: CPT | Performed by: OBSTETRICS & GYNECOLOGY

## 2024-10-30 PROCEDURE — 77065 DX MAMMO INCL CAD UNI: CPT | Performed by: OBSTETRICS & GYNECOLOGY

## 2024-12-05 ENCOUNTER — TELEPHONE (OUTPATIENT)
Dept: OBGYN CLINIC | Facility: CLINIC | Age: 49
End: 2024-12-05

## 2024-12-05 ENCOUNTER — OFFICE VISIT (OUTPATIENT)
Dept: FAMILY MEDICINE CLINIC | Facility: CLINIC | Age: 49
End: 2024-12-05
Payer: COMMERCIAL

## 2024-12-05 VITALS
BODY MASS INDEX: 27.82 KG/M2 | HEIGHT: 65 IN | HEART RATE: 70 BPM | RESPIRATION RATE: 18 BRPM | DIASTOLIC BLOOD PRESSURE: 84 MMHG | SYSTOLIC BLOOD PRESSURE: 102 MMHG | TEMPERATURE: 98 F | WEIGHT: 167 LBS | OXYGEN SATURATION: 99 %

## 2024-12-05 DIAGNOSIS — R39.9 UTI SYMPTOMS: Primary | ICD-10-CM

## 2024-12-05 LAB
APPEARANCE: CLEAR
BILIRUBIN: NEGATIVE
GLUCOSE (URINE DIPSTICK): NEGATIVE MG/DL
KETONES (URINE DIPSTICK): NEGATIVE MG/DL
MULTISTIX LOT#: ABNORMAL NUMERIC
NITRITE, URINE: NEGATIVE
PH, URINE: 6.5 (ref 4.5–8)
PROTEIN (URINE DIPSTICK): NEGATIVE MG/DL
SPECIFIC GRAVITY: 1.01 (ref 1–1.03)
URINE-COLOR: YELLOW
UROBILINOGEN,SEMI-QN: 0.2 MG/DL (ref 0–1.9)

## 2024-12-05 PROCEDURE — 87186 SC STD MICRODIL/AGAR DIL: CPT | Performed by: NURSE PRACTITIONER

## 2024-12-05 PROCEDURE — 81003 URINALYSIS AUTO W/O SCOPE: CPT | Performed by: NURSE PRACTITIONER

## 2024-12-05 PROCEDURE — 3008F BODY MASS INDEX DOCD: CPT | Performed by: NURSE PRACTITIONER

## 2024-12-05 PROCEDURE — 3079F DIAST BP 80-89 MM HG: CPT | Performed by: NURSE PRACTITIONER

## 2024-12-05 PROCEDURE — 87088 URINE BACTERIA CULTURE: CPT | Performed by: NURSE PRACTITIONER

## 2024-12-05 PROCEDURE — 3074F SYST BP LT 130 MM HG: CPT | Performed by: NURSE PRACTITIONER

## 2024-12-05 PROCEDURE — 87086 URINE CULTURE/COLONY COUNT: CPT | Performed by: NURSE PRACTITIONER

## 2024-12-05 PROCEDURE — 99213 OFFICE O/P EST LOW 20 MIN: CPT | Performed by: NURSE PRACTITIONER

## 2024-12-05 RX ORDER — NITROFURANTOIN 25; 75 MG/1; MG/1
100 CAPSULE ORAL 2 TIMES DAILY
Qty: 14 CAPSULE | Refills: 0 | Status: SHIPPED | OUTPATIENT
Start: 2024-12-05 | End: 2024-12-12

## 2024-12-05 NOTE — TELEPHONE ENCOUNTER
Discussed ultrasound  righ ovarian cyst, 2.6 cm, mixed echogenic cyst, possible dermoid  She has 'usual pains'  Conservative management for now, repeat ultrasound in December
oral

## 2024-12-05 NOTE — TELEPHONE ENCOUNTER
Patient was seen in office 10/16 and discussed getting started on a medication and was told to call if she wanted to have prescription sent in. Please advise, thank you

## 2024-12-05 NOTE — TELEPHONE ENCOUNTER
I spoke to pt, she is requesting Rx for vaginal estrogen for her vaginal dryness. Discussed at annual OV 10/16    Atrophic vaginitis- offered trial of vaginal estrogen

## 2024-12-05 NOTE — PROGRESS NOTES
CHIEF COMPLAINT:     Chief Complaint   Patient presents with    Urinary Symptoms     Symptoms started on Sunday : Bladder pressure, itching , and urgency.   OTC: None        HPI:   Tiff Pinto is a 49 year old female who presents with symptoms of UTI. Complaining of urinary frequency, urgency, dysuria for last 3-4 days. Symptoms have been worsening since onset.  Treatments tried: lemon water.  Associated symptoms: irritation at urethra after inating. .   Denies flank pain, fever, hematuria, nausea, or vomiting.  Denies vaginal discharge or itching.  Current Outpatient Medications   Medication Sig Dispense Refill    nitrofurantoin monohydrate macro 100 MG Oral Cap Take 1 capsule (100 mg total) by mouth 2 (two) times daily for 7 days. 14 capsule 0    pseudoephedrine  MG Oral Tablet 12 Hr Take 1 tablet (120 mg total) by mouth daily.      melatonin 3 MG Oral Tab Take 1 tablet (3 mg total) by mouth nightly.      Calcium Carbonate Antacid 750 MG Oral Chew Tab Chew by mouth As Directed.      Emollient (COLLAGEN EX) Take by mouth.      fluticasone propionate 50 MCG/ACT Nasal Suspension 2 sprays by Nasal route 2 (two) times daily. 3 each 3    Omega-3-acid Ethyl Esters 1 g Oral Cap Take 1 capsule (1 g total) by mouth daily.      Ergocalciferol (VITAMIN D2) 2000 units Oral Tab Take 1 tablet by mouth.      Acetaminophen  MG Oral Tab CR Take by mouth.      Multiple Vitamin (DAILY MULTIVITAMIN OR) Take by mouth daily.      Probiotic Product (PROBIOTIC OR) Take by mouth daily.      Cetirizine HCl (ZYRTEC ALLERGY OR) Take by mouth.        Past Medical History:    Allergic rhinitis    ANEMIA    Endometriosis    Fe deficiency anemia    GERD (gastroesophageal reflux disease)    egd    Pap smear for cervical cancer screening    wnl      Social History:  Social History     Socioeconomic History    Marital status:     Number of children: 1   Occupational History    Occupation: homemaker   Tobacco Use    Smoking  status: Former     Current packs/day: 0.50     Average packs/day: 0.5 packs/day for 10.0 years (5.0 ttl pk-yrs)     Types: Cigarettes    Smokeless tobacco: Never   Vaping Use    Vaping status: Never Used   Substance and Sexual Activity    Alcohol use: Yes     Alcohol/week: 4.0 - 6.0 standard drinks of alcohol     Types: 4 - 6 Standard drinks or equivalent per week     Comment: Occasional    Drug use: No    Sexual activity: Yes     Partners: Male     Birth control/protection: Condom, Vasectomy   Other Topics Concern    Caffeine Concern Yes     Comment: 2-4 cups of coffee x day    Exercise Yes     Comment: 2-3 x week, cardio, weights    Seat Belt Yes         REVIEW OF SYSTEMS:   GENERAL: Denies fever, chills, or body aches  SKIN: no rashes, no skin wounds or ulcers.  EYES:denies blurred vision or double vision  HEENT: no congestion, rhinorrhea, sore throat or ear pain  CARDIOVASCULAR: denies chest pain or palpitations  LUNGS: denies shortness of breath, cough, or wheezing  GI: See HPI. No N/V/C/D.   : See HPI.  NEURO: no headaches.    EXAM:   /84   Pulse 70   Temp 97.5 °F (36.4 °C)   Resp 18   Ht 5' 5\" (1.651 m)   Wt 167 lb (75.8 kg)   SpO2 99%   BMI 27.79 kg/m²   GENERAL: well developed, well nourished,in no apparent distress  CARDIO: RRR, no murmurs  LUNGS: clear to ausculation bilaterally, no wheezing or rhonchi  GI: BS present x 4.  No hepatosplenomegaly.  : mild suprapubic tenderness, No bladder distention, or CVAT     Recent Results (from the past 24 hours)   URINALYSIS, AUTO, W/O SCOPE    Collection Time: 12/05/24 11:04 AM   Result Value Ref Range    Glucose Urine Negative Negative mg/dL    Bilirubin Urine Negative Negative    Ketones, UA Negative Negative - Trace mg/dL    Spec Gravity 1.010 1.005 - 1.030    Blood Urine Trace-lysed (A) Negative    PH Urine 6.5 5.0 - 8.0    Protein Urine Negative Negative - Trace mg/dL    Urobilinogen Urine 0.2 0.2 - 1.0 mg/dL    Nitrite Urine Negative Negative     Leukocyte Esterase Urine Small (A) Negative    APPEARANCE clear Clear    Color Urine yellow Yellow    Mult[employer]ix Lot# [id number] Numeric    Multistix Expiration Date 05-31-25 Date         ASSESSMENT AND PLAN:   Tiff Pinto is a 49 year old female presents with UTI symptoms.    ASSESSMENT:  Encounter Diagnosis   Name Primary?    UTI symptoms Yes       PLAN: Meds as listed below.  Comfort measures as described in Patient Instructions. Patient/Parent has given us consent to send out a culture and understand that they will be contacted in 2-3 days with culture results. If any severe back pain, inability to urinate, fever >101 or persistent vomiting seek emergent care.         Meds & Refills for this Visit:  Requested Prescriptions     Signed Prescriptions Disp Refills    nitrofurantoin monohydrate macro 100 MG Oral Cap 14 capsule 0     Sig: Take 1 capsule (100 mg total) by mouth 2 (two) times daily for 7 days.       Risk and benefits of medication discussed. Stressed importance of completing full course of antibiotic.     There are no Patient Instructions on file for this visit.      The patient indicates understanding of these issues and agrees to the plan.  The patient is asked to return in 3 days if not better. Call if fever, vomiting, worsening symptoms.  Go to ED if back/flank pain with fever and vomiting.

## 2024-12-08 PROBLEM — N95.2 ATROPHIC VAGINITIS: Status: ACTIVE | Noted: 2024-12-08

## 2024-12-18 NOTE — TELEPHONE ENCOUNTER
Pt spoke with pt about Estradiol tablets & possible side effects. Pt  just treated [facility]. Advised pt if symptoms return or estradiol tab is not helping, follow up in office with Dr. Burrell.

## 2025-05-12 ENCOUNTER — HOSPITAL ENCOUNTER (OUTPATIENT)
Age: 50
Discharge: HOME | End: 2025-05-12
Payer: COMMERCIAL

## 2025-05-12 VITALS
RESPIRATION RATE: 18 BRPM | SYSTOLIC BLOOD PRESSURE: 116 MMHG | TEMPERATURE: 98 F | HEART RATE: 72 BPM | DIASTOLIC BLOOD PRESSURE: 75 MMHG | OXYGEN SATURATION: 98 %

## 2025-05-12 DIAGNOSIS — J30.9 ALLERGIC SINUSITIS: Primary | ICD-10-CM

## 2025-05-12 DIAGNOSIS — R09.82 PND (POST-NASAL DRIP): ICD-10-CM

## 2025-05-12 DIAGNOSIS — R05.1 ACUTE COUGH: ICD-10-CM

## 2025-05-12 PROCEDURE — 99214 OFFICE O/P EST MOD 30 MIN: CPT | Performed by: NURSE PRACTITIONER

## 2025-05-12 RX ORDER — METHYLPREDNISOLONE 4 MG/1
TABLET ORAL
Qty: 1 EACH | Refills: 0 | Status: SHIPPED | OUTPATIENT
Start: 2025-05-12

## 2025-05-12 NOTE — ED PROVIDER NOTES
Patient Seen in: [facility]      History     Chief Complaint   Patient presents with    Sinus Problem     Along with cough and sore throat from drainage.  Body aches, fatigue.  The usual sinus symtoms. - Entered by patient     Stated Complaint: Sinus Problem - Along with cough and sore throat from drainage.  Body aches, fa*    Subjective:   HPI    50-year-old female presents today with complaints of sinus congestion and cough since Tuesday.  Patient states brick was being placed on the sides of her driveway and then she was blowing away the debris and came into contact with the dust.  Patient states that ever since then she has had congestion and a cough.  History of Present Illness               Objective:     Past Medical History:    Allergic rhinitis    ANEMIA    Endometriosis    Fe deficiency anemia    GERD (gastroesophageal reflux disease)    egd    Pap smear for cervical cancer screening    wnl              Past Surgical History:   Procedure Laterality Date    Laparoscopy,pelvic,biopsy  2006                Social History     Socioeconomic History    Marital status:     Number of children: 1   Occupational History    Occupation: homemaker   Tobacco Use    Smoking status: Former     Current packs/day: 0.50     Average packs/day: 0.5 packs/day for 10.0 years (5.0 ttl pk-yrs)     Types: Cigarettes    Smokeless tobacco: Never   Vaping Use    Vaping status: Never Used   Substance and Sexual Activity    Alcohol use: Yes     Alcohol/week: 4.0 - 6.0 standard drinks of alcohol     Types: 4 - 6 Standard drinks or equivalent per week     Comment: Occasional    Drug use: No    Sexual activity: Yes     Partners: Male     Birth control/protection: Condom, Vasectomy   Other Topics Concern    Caffeine Concern Yes     Comment: 2-4 cups of coffee x day    Exercise Yes     Comment: 2-3 x week, cardio, weights    Seat Belt Yes              Review of Systems   Constitutional: Negative.    HENT:  Positive for  congestion and sinus pressure.    Eyes: Negative.    Respiratory:  Positive for cough.    Cardiovascular: Negative.    Gastrointestinal: Negative.    Endocrine: Negative.    Genitourinary: Negative.    Musculoskeletal: Negative.    Skin: Negative.    Allergic/Immunologic: Negative.    Neurological: Negative.    Hematological: Negative.    Psychiatric/Behavioral: Negative.         Positive for stated complaint: Sinus Problem - Along with cough and sore throat from drainage.  Body aches, fa*  Other systems are as noted in HPI.  Constitutional and vital signs reviewed.      All other systems reviewed and negative except as noted above.                  Physical Exam     ED Triage Vitals [05/12/25 0915]   /75   Pulse 72   Resp 18   Temp 98.3 °F (36.8 °C)   Temp src Oral   SpO2 98 %   O2 Device None (Room air)       Current Vitals:   Vital Signs  BP: 116/75  Pulse: 72  Resp: 18  Temp: 98.3 °F (36.8 °C)  Temp src: Oral    Oxygen Therapy  SpO2: 98 %  O2 Device: None (Room air)        Physical Exam  Vitals and nursing note reviewed.   Constitutional:       Appearance: Normal appearance. She is normal weight.   HENT:      Head: Normocephalic and atraumatic.      Right Ear: Tympanic membrane, ear canal and external ear normal.      Left Ear: Tympanic membrane, ear canal and external ear normal.      Nose: Congestion present.      Comments: Mild congestion appreciated to the right side.     Mouth/Throat:      Mouth: Mucous membranes are moist.      Pharynx: Oropharynx is clear.      Comments: PND noted  Eyes:      Extraocular Movements: Extraocular movements intact.      Conjunctiva/sclera: Conjunctivae normal.      Pupils: Pupils are equal, round, and reactive to light.   Cardiovascular:      Rate and Rhythm: Normal rate and regular rhythm.      Pulses: Normal pulses.      Heart sounds: Normal heart sounds.   Pulmonary:      Effort: Pulmonary effort is normal.      Breath sounds: Normal breath sounds.   Musculoskeletal:       Cervical back: Normal range of motion and neck supple.   Neurological:      General: No focal deficit present.      Mental Status: She is alert.   Psychiatric:         Mood and Affect: Mood normal.         Physical Exam                ED Course   Labs Reviewed - No data to display       Results                           MDM      50-year-old female presents today with complaints of sinus congestion and cough since Tuesday.  Patient states brick was being placed on the sides of her driveway and then she was blowing away the debris and came into contact with the dust.  Patient states that ever since then she has had congestion and a cough.  Vital signs: Please see EMR.  Physical exam: Please see exam.  Differential diagnosis: Sinusitis, postnasal drip, pneumonia, bronchitis.  Based on physical exam and HPI will diagnose with sinusitis not bacterial and postnasal drip causing cough.  Will place patient on a short course of steroid.  Patient instructed to increase her fluid intake.  Patient instructed to return for care if she develops any worsening symptoms or fever with chills.        Medical Decision Making  50-year-old female presents today with complaints of sinus congestion and cough since Tuesday.  Patient states brick was being placed on the sides of her driveway and then she was blowing away the debris and came into contact with the dust.  Patient states that ever since then she has had congestion and a cough.    Problems Addressed:  Acute cough: acute illness or injury  Allergic sinusitis: acute illness or injury  PND (post-nasal drip): acute illness or injury    Risk  Prescription drug management.        Disposition and Plan     Clinical Impression:  1. Allergic sinusitis    2. PND (post-nasal drip)    3. Acute cough         Disposition:  Discharge  5/12/2025  9:30 am    Follow-up:  Joselo Smith MD  [address] [place] [address] [id number] [phone]    In 3 days            Medications  Prescribed:  Current Discharge Medication List        START taking these medications    Details   methylPREDNISolone (MEDROL) 4 MG Oral Tablet Therapy Pack Dosepack: take as directed  Qty: 1 each, Refills: 0             Supplementary Documentation:

## 2025-05-12 NOTE — ED INITIAL ASSESSMENT (HPI)
Pt with environmental exposure on tues brick dust and then started feeling sick on tues night. Cough, postnasal drip, pressure in face.  Denies fevers.